# Patient Record
Sex: MALE | Race: WHITE | NOT HISPANIC OR LATINO | Employment: OTHER | URBAN - METROPOLITAN AREA
[De-identification: names, ages, dates, MRNs, and addresses within clinical notes are randomized per-mention and may not be internally consistent; named-entity substitution may affect disease eponyms.]

---

## 2017-01-18 RX ORDER — TAMSULOSIN HYDROCHLORIDE 0.4 MG/1
1 CAPSULE ORAL NIGHTLY
Qty: 30 CAPSULE | Refills: 0 | Status: SHIPPED | OUTPATIENT
Start: 2017-01-18 | End: 2017-02-15 | Stop reason: SDUPTHER

## 2017-02-15 RX ORDER — TAMSULOSIN HYDROCHLORIDE 0.4 MG/1
1 CAPSULE ORAL NIGHTLY
Qty: 30 CAPSULE | Refills: 0 | Status: SHIPPED | OUTPATIENT
Start: 2017-02-15 | End: 2017-03-16 | Stop reason: SDUPTHER

## 2017-03-16 RX ORDER — TAMSULOSIN HYDROCHLORIDE 0.4 MG/1
CAPSULE ORAL
Qty: 28 CAPSULE | Refills: 3 | Status: SHIPPED | OUTPATIENT
Start: 2017-03-16 | End: 2017-07-06 | Stop reason: SDUPTHER

## 2017-06-08 ENCOUNTER — HOSPITAL ENCOUNTER (OUTPATIENT)
Facility: HOSPITAL | Age: 82
Discharge: HOME | End: 2017-06-09
Attending: EMERGENCY MEDICINE | Admitting: EMERGENCY MEDICINE
Payer: MEDICARE

## 2017-06-08 DIAGNOSIS — R55 SYNCOPE: ICD-10-CM

## 2017-06-08 DIAGNOSIS — A49.9 BACTERIAL UTI: Primary | ICD-10-CM

## 2017-06-08 DIAGNOSIS — N39.0 URINARY TRACT INFECTION WITHOUT HEMATURIA: ICD-10-CM

## 2017-06-08 DIAGNOSIS — N39.0 BACTERIAL UTI: Primary | ICD-10-CM

## 2017-06-08 LAB
ALBUMIN SERPL BCP-MCNC: 3.1 G/DL
ALP SERPL-CCNC: 117 U/L
ALT SERPL W/O P-5'-P-CCNC: 8 U/L
ANION GAP SERPL CALC-SCNC: 7 MMOL/L
AST SERPL-CCNC: 14 U/L
BASOPHILS # BLD AUTO: 0.01 K/UL
BASOPHILS NFR BLD: 0.1 %
BILIRUB SERPL-MCNC: 0.5 MG/DL
BNP SERPL-MCNC: 18 PG/ML
BUN SERPL-MCNC: 36 MG/DL
CALCIUM SERPL-MCNC: 8.8 MG/DL
CHLORIDE SERPL-SCNC: 107 MMOL/L
CO2 SERPL-SCNC: 27 MMOL/L
CREAT SERPL-MCNC: 1.3 MG/DL
DIFFERENTIAL METHOD: ABNORMAL
EOSINOPHIL # BLD AUTO: 0.1 K/UL
EOSINOPHIL NFR BLD: 2.1 %
ERYTHROCYTE [DISTWIDTH] IN BLOOD BY AUTOMATED COUNT: 13.5 %
EST. GFR  (AFRICAN AMERICAN): 53.2 ML/MIN/1.73 M^2
EST. GFR  (NON AFRICAN AMERICAN): 46.1 ML/MIN/1.73 M^2
GLUCOSE SERPL-MCNC: 137 MG/DL
HCT VFR BLD AUTO: 39.7 %
HGB BLD-MCNC: 13.4 G/DL
INR PPP: 1
LYMPHOCYTES # BLD AUTO: 0.9 K/UL
LYMPHOCYTES NFR BLD: 13.7 %
MAGNESIUM SERPL-MCNC: 2.1 MG/DL
MCH RBC QN AUTO: 32.8 PG
MCHC RBC AUTO-ENTMCNC: 33.8 %
MCV RBC AUTO: 97 FL
MONOCYTES # BLD AUTO: 0.3 K/UL
MONOCYTES NFR BLD: 4.9 %
NEUTROPHILS # BLD AUTO: 5.3 K/UL
NEUTROPHILS NFR BLD: 79.1 %
PLATELET # BLD AUTO: 158 K/UL
PMV BLD AUTO: 10 FL
POTASSIUM SERPL-SCNC: 3.8 MMOL/L
PROT SERPL-MCNC: 6.4 G/DL
PROTHROMBIN TIME: 10.8 SEC
RBC # BLD AUTO: 4.09 M/UL
SODIUM SERPL-SCNC: 141 MMOL/L
TROPONIN I SERPL DL<=0.01 NG/ML-MCNC: 0.01 NG/ML
WBC # BLD AUTO: 6.7 K/UL

## 2017-06-08 PROCEDURE — 99285 EMERGENCY DEPT VISIT HI MDM: CPT | Mod: 25

## 2017-06-08 PROCEDURE — 81001 URINALYSIS AUTO W/SCOPE: CPT

## 2017-06-08 PROCEDURE — G0378 HOSPITAL OBSERVATION PER HR: HCPCS

## 2017-06-08 PROCEDURE — 25000003 PHARM REV CODE 250: Performed by: EMERGENCY MEDICINE

## 2017-06-08 PROCEDURE — 87086 URINE CULTURE/COLONY COUNT: CPT

## 2017-06-08 PROCEDURE — 93010 ELECTROCARDIOGRAM REPORT: CPT | Mod: ,,, | Performed by: INTERNAL MEDICINE

## 2017-06-08 PROCEDURE — 83880 ASSAY OF NATRIURETIC PEPTIDE: CPT

## 2017-06-08 PROCEDURE — 96360 HYDRATION IV INFUSION INIT: CPT

## 2017-06-08 PROCEDURE — 85610 PROTHROMBIN TIME: CPT

## 2017-06-08 PROCEDURE — 81003 URINALYSIS AUTO W/O SCOPE: CPT

## 2017-06-08 PROCEDURE — 96361 HYDRATE IV INFUSION ADD-ON: CPT

## 2017-06-08 PROCEDURE — 84484 ASSAY OF TROPONIN QUANT: CPT

## 2017-06-08 PROCEDURE — 36415 COLL VENOUS BLD VENIPUNCTURE: CPT

## 2017-06-08 PROCEDURE — 83735 ASSAY OF MAGNESIUM: CPT

## 2017-06-08 PROCEDURE — 99285 EMERGENCY DEPT VISIT HI MDM: CPT | Mod: ,,, | Performed by: EMERGENCY MEDICINE

## 2017-06-08 PROCEDURE — 93005 ELECTROCARDIOGRAM TRACING: CPT

## 2017-06-08 PROCEDURE — 80053 COMPREHEN METABOLIC PANEL: CPT

## 2017-06-08 PROCEDURE — 80048 BASIC METABOLIC PNL TOTAL CA: CPT

## 2017-06-08 PROCEDURE — 85025 COMPLETE CBC W/AUTO DIFF WBC: CPT

## 2017-06-08 RX ORDER — TAMSULOSIN HYDROCHLORIDE 0.4 MG/1
1 CAPSULE ORAL NIGHTLY
Status: DISCONTINUED | OUTPATIENT
Start: 2017-06-09 | End: 2017-06-09 | Stop reason: HOSPADM

## 2017-06-08 RX ORDER — ACETAMINOPHEN 325 MG/1
650 TABLET ORAL EVERY 6 HOURS PRN
Status: DISCONTINUED | OUTPATIENT
Start: 2017-06-09 | End: 2017-06-09 | Stop reason: HOSPADM

## 2017-06-08 RX ORDER — POLYETHYLENE GLYCOL 3350 17 G/17G
17 POWDER, FOR SOLUTION ORAL EVERY 12 HOURS PRN
Status: DISCONTINUED | OUTPATIENT
Start: 2017-06-09 | End: 2017-06-09 | Stop reason: HOSPADM

## 2017-06-08 RX ORDER — RAMELTEON 8 MG/1
8 TABLET ORAL NIGHTLY PRN
Status: DISCONTINUED | OUTPATIENT
Start: 2017-06-09 | End: 2017-06-09 | Stop reason: HOSPADM

## 2017-06-08 RX ADMIN — SODIUM CHLORIDE 1000 ML: 0.9 INJECTION, SOLUTION INTRAVENOUS at 05:06

## 2017-06-08 NOTE — ED PROVIDER NOTES
"Encounter Date: 6/8/2017    SCRIBE #1 NOTE: I, Jennifermandi Rivera, am scribing for, and in the presence of, Dr. Woods.       History     Chief Complaint   Patient presents with    Loss of Consciousness     witnessed syncopal episode for 10 seconds.      Review of patient's allergies indicates:  No Known Allergies  Time seen by provider: 2:32 PM    This is a 89 y.o. male with a PMHx of dementia, CKD, and BPH and no pertinent PSHx who presents for evaluation after a witnessed syncopal episode. Patient's son explains patient was eating lunch when he suddenly "froze," and his head dropped down. Son states patient became "extremely rigid and non-responsive" but was still breathing. He claims he had to pry his jaw open to get the food out. He reports the patient became lucid after two sternal rubs. The episode lasted approximately 1 minute. Patient has no complaints and states he feels fine at this time. He denies chest pain, shortness of breath, headache, and abdominal pain. Patient's son denies a hx of seizures.         The history is provided by the patient, a relative and medical records.     Past Medical History:   Diagnosis Date    BPH (benign prostatic hyperplasia)     Chronic kidney disease     kidney stones    Dementia     Hypertension     Osteoarthritis     Weight loss      Past Surgical History:   Procedure Laterality Date    HEMORRHOID SURGERY      Renal stone extraction       Family History   Problem Relation Age of Onset    Stroke Mother     Heart disease Father      Social History   Substance Use Topics    Smoking status: Never Smoker    Smokeless tobacco: Not on file      Comment: The patient lives at home with his wife who is impaired. His daughter recently has been caring friend, Shakira.    Alcohol use Yes      Comment: whiskey or gin rarely     Review of Systems   Constitutional: Negative for fever.   HENT: Negative for nosebleeds.    Eyes: Negative for redness.   Respiratory: Negative for " shortness of breath.    Cardiovascular: Negative for chest pain.   Gastrointestinal: Negative for abdominal pain.   Genitourinary: Negative for hematuria.   Musculoskeletal: Negative for neck pain.   Skin: Negative for rash.   Neurological: Positive for syncope. Negative for speech difficulty and headaches.       Physical Exam     Initial Vitals [06/08/17 1307]   BP Pulse Resp Temp SpO2   130/86 72 16 98.1 °F (36.7 °C) 96 %     Physical Exam    Nursing note and vitals reviewed.  Constitutional: He appears well-developed and well-nourished. No distress.   HENT:   Head: Normocephalic and atraumatic.   Mouth/Throat: Oropharynx is clear and moist.   Eyes: Conjunctivae are normal.   Neck: Normal range of motion.   Cardiovascular: Normal rate, regular rhythm and normal heart sounds.   Pulmonary/Chest: Breath sounds normal. No respiratory distress.   Abdominal: Soft. He exhibits no distension. There is no tenderness.   Musculoskeletal: Normal range of motion.   Neurological: He is alert and oriented to person, place, and time. He has normal strength. No cranial nerve deficit or sensory deficit.   Skin: Skin is warm and dry.         ED Course   Procedures  Labs Reviewed   CBC W/ AUTO DIFFERENTIAL - Abnormal; Notable for the following:        Result Value    RBC 4.09 (*)     Hemoglobin 13.4 (*)     Hematocrit 39.7 (*)     MCH 32.8 (*)     Lymph # 0.9 (*)     Gran% 79.1 (*)     Lymph% 13.7 (*)     All other components within normal limits   COMPREHENSIVE METABOLIC PANEL - Abnormal; Notable for the following:     Glucose 137 (*)     BUN, Bld 36 (*)     Albumin 3.1 (*)     ALT 8 (*)     Anion Gap 7 (*)     eGFR if  53.2 (*)     eGFR if non  46.1 (*)     All other components within normal limits   MAGNESIUM   PROTIME-INR   TROPONIN I   B-TYPE NATRIURETIC PEPTIDE     EKG Readings: (Independently Interpreted)   Sinus at 67. Left anterior fascicular block.       X-Rays:   Independently Interpreted  Readings:   Chest X-Ray: Normal heart size.  No infiltrates.  No acute abnormalities.     Medical Decision Making:   History:   Old Medical Records: I decided to obtain old medical records.  Initial Assessment:   Emergent evaluation of syncopal episode, witnessed. Initial concern for lethal cardiac dysrhythmia, vagovagal episode, aspiration event. Unlikely to be CVA given complete resolution of symptoms at this time. I will get labs, EKG, cardiac monitoring, and reassess. Patient is DNR per son.   Independently Interpreted Test(s):   I have ordered and independently interpreted X-rays - see prior notes.  I have ordered and independently interpreted EKG Reading(s) - see prior notes  Clinical Tests:   Lab Tests: Ordered and Reviewed  Radiological Study: Ordered and Reviewed  Medical Tests: Ordered and Reviewed            Scribe Attestation:   Scribe #1: I performed the above scribed service and the documentation accurately describes the services I performed. I attest to the accuracy of the note.    Attending Attestation:           Physician Attestation for Scribe:  Physician Attestation Statement for Scribe #1: I, Dr. Woods, reviewed documentation, as scribed by Jennifer Rivera in my presence, and it is both accurate and complete.         Attending ED Notes:   Patient remains asymptomatic. I will admit to the hospital for cardiac monitoring given his syncope and risk factors. Son was updated.          ED Course     Clinical Impression:   The primary encounter diagnosis was Bacterial UTI. A diagnosis of Syncope was also pertinent to this visit.    Disposition:   Disposition: Admitted       Erik Woods MD  06/09/17 6926

## 2017-06-08 NOTE — ED NOTES
Patient identifiers verified and correct for Ruben Jaye.   LOC: The patient is awake, alert and aware of environment with an appropriate affect, the patient is oriented to person only.  APPEARANCE: Patient appears comfortable and in no acute distress, patient is clean and well groomed.  SKIN: The skin is warm and dry, color consistent with ethnicity, patient has normal skin turgor and moist mucus membranes, skin intact, no breakdown or bruising noted.   MUSCULOSKELETAL: Patient moving all extremities spontaneously, no swelling noted.  RESPIRATORY: Airway is open and patent, respirations are spontaneous, patient has a normal effort and rate, no accessory muscle use noted, pt placed on continuous pulse ox with O2 sats noted at 97% on room air.  CARDIAC: Pt placed on cardiac monitor. Patient has a normal rate and regular rhythm, no edema noted, capillary refill < 3 seconds.   GASTRO: Soft and non tender to palpation, no distention noted, normoactive bowel sounds present in all four quadrants. Pt states bowel movements have been regular.  : Pt denies any pain or frequency with urination.  NEURO: Pt opens eyes spontaneously, behavior appropriate to situation, follows commands, facial expression symmetrical, bilateral hand grasp equal and even, purposeful motor response noted, normal sensation in all extremities when touched with a finger.

## 2017-06-08 NOTE — ED TRIAGE NOTES
Pt was at nursing facility today with his son. Son states that pt had an episode of unresponsiveness that lasted less than 1 minute. This incident occurred at [id number] today. Pt denies ha/n/v/diarrhea

## 2017-06-09 VITALS
DIASTOLIC BLOOD PRESSURE: 78 MMHG | TEMPERATURE: 98 F | WEIGHT: 160.06 LBS | BODY MASS INDEX: 26.67 KG/M2 | SYSTOLIC BLOOD PRESSURE: 146 MMHG | RESPIRATION RATE: 18 BRPM | HEIGHT: 65 IN | HEART RATE: 76 BPM | OXYGEN SATURATION: 96 %

## 2017-06-09 LAB
ANION GAP SERPL CALC-SCNC: 6 MMOL/L
BASOPHILS # BLD AUTO: 0.02 K/UL
BASOPHILS NFR BLD: 0.3 %
BILIRUB UR QL STRIP: NEGATIVE
BUN SERPL-MCNC: 33 MG/DL
CALCIUM SERPL-MCNC: 8.5 MG/DL
CHLORIDE SERPL-SCNC: 108 MMOL/L
CLARITY UR REFRACT.AUTO: ABNORMAL
CO2 SERPL-SCNC: 26 MMOL/L
COLOR UR AUTO: YELLOW
CREAT SERPL-MCNC: 1.2 MG/DL
DIFFERENTIAL METHOD: ABNORMAL
EOSINOPHIL # BLD AUTO: 0.4 K/UL
EOSINOPHIL NFR BLD: 6.3 %
ERYTHROCYTE [DISTWIDTH] IN BLOOD BY AUTOMATED COUNT: 13.2 %
EST. GFR  (AFRICAN AMERICAN): 58.6 ML/MIN/1.73 M^2
EST. GFR  (NON AFRICAN AMERICAN): 50.7 ML/MIN/1.73 M^2
GLUCOSE SERPL-MCNC: 119 MG/DL
GLUCOSE UR QL STRIP: NEGATIVE
HCT VFR BLD AUTO: 38 %
HGB BLD-MCNC: 12.8 G/DL
HGB UR QL STRIP: ABNORMAL
KETONES UR QL STRIP: ABNORMAL
LEUKOCYTE ESTERASE UR QL STRIP: ABNORMAL
LYMPHOCYTES # BLD AUTO: 2.1 K/UL
LYMPHOCYTES NFR BLD: 31.9 %
MCH RBC QN AUTO: 32.2 PG
MCHC RBC AUTO-ENTMCNC: 33.7 %
MCV RBC AUTO: 96 FL
MICROSCOPIC COMMENT: ABNORMAL
MONOCYTES # BLD AUTO: 0.6 K/UL
MONOCYTES NFR BLD: 9.2 %
NEUTROPHILS # BLD AUTO: 3.4 K/UL
NEUTROPHILS NFR BLD: 52.1 %
NITRITE UR QL STRIP: NEGATIVE
PH UR STRIP: 5 [PH] (ref 5–8)
PLATELET # BLD AUTO: 156 K/UL
PMV BLD AUTO: 9.8 FL
POTASSIUM SERPL-SCNC: 3.8 MMOL/L
PROT UR QL STRIP: NEGATIVE
RBC # BLD AUTO: 3.97 M/UL
RBC #/AREA URNS AUTO: 14 /HPF (ref 0–4)
SODIUM SERPL-SCNC: 140 MMOL/L
SP GR UR STRIP: 1.01 (ref 1–1.03)
URN SPEC COLLECT METH UR: ABNORMAL
UROBILINOGEN UR STRIP-ACNC: NEGATIVE EU/DL
WBC # BLD AUTO: 6.49 K/UL
WBC #/AREA URNS AUTO: 35 /HPF (ref 0–5)

## 2017-06-09 PROCEDURE — 36415 COLL VENOUS BLD VENIPUNCTURE: CPT

## 2017-06-09 PROCEDURE — 85025 COMPLETE CBC W/AUTO DIFF WBC: CPT

## 2017-06-09 PROCEDURE — G0378 HOSPITAL OBSERVATION PER HR: HCPCS

## 2017-06-09 PROCEDURE — 99217 PR OBSERVATION CARE DISCHARGE: CPT | Mod: ,,, | Performed by: PHYSICIAN ASSISTANT

## 2017-06-09 PROCEDURE — 63600175 PHARM REV CODE 636 W HCPCS: Performed by: PHYSICIAN ASSISTANT

## 2017-06-09 PROCEDURE — 97161 PT EVAL LOW COMPLEX 20 MIN: CPT

## 2017-06-09 PROCEDURE — 97116 GAIT TRAINING THERAPY: CPT

## 2017-06-09 PROCEDURE — G8979 MOBILITY GOAL STATUS: HCPCS | Mod: CJ

## 2017-06-09 PROCEDURE — G8978 MOBILITY CURRENT STATUS: HCPCS | Mod: CK

## 2017-06-09 PROCEDURE — G8980 MOBILITY D/C STATUS: HCPCS | Mod: CK

## 2017-06-09 RX ORDER — CIPROFLOXACIN 500 MG/1
500 TABLET ORAL 2 TIMES DAILY
Qty: 10 TABLET | Refills: 0 | Status: SHIPPED | OUTPATIENT
Start: 2017-06-09 | End: 2017-06-14

## 2017-06-09 RX ADMIN — CEFTRIAXONE 1 G: 1 INJECTION, SOLUTION INTRAVENOUS at 08:06

## 2017-06-09 NOTE — PLAN OF CARE
Problem: Patient Care Overview  Goal: Plan of Care Review  Outcome: Ongoing (interventions implemented as appropriate)  Pt on fall precautions. Yellow fall risk band and socks on pt. Instructed pt to call for assistance as needed and pt voiced understanding. Disoriented to time and place. SR with PVCs on tele.

## 2017-06-09 NOTE — ASSESSMENT & PLAN NOTE
- Pt with witnessed syncopal episode, lasting only a few seconds  - EKG NSR with PAC.  CXR no active cardiopulmonary disease.  - No events on tele  - family states patient at baseline throughout hospital stay  - treat UTI as below

## 2017-06-09 NOTE — ASSESSMENT & PLAN NOTE
- UA+, given rocephin x 1, will d/c with cipro x 5 days  - previous cultures growing pan-sensitive proteus  - patient non-toxic, no leukocytosis, no fever, at baseline MS per family

## 2017-06-09 NOTE — DISCHARGE SUMMARY
[facility] Hospital Medicine  Discharge Summary      Patient Name: Rbuen Cee Sr.  MRN: [id number]  Admission Date: 6/8/2017  Hospital Length of Stay: 0 days  Discharge Date and Time:  06/09/2017 10:03 AM  Attending Physician: Tristen Van MD   Discharging Provider: Dawson Lópze PA-C  Primary Care Provider: Torres Carcamo Jr, MD  [facility] Medicine Team: [facility] MED F Dawson López PA-C    HPI:   89 y/o M with PMH dementia, osteoarthritis, benign prostatic hyperplasia, and chronic kidney disease stage 3 presents to ED after witnessed syncopal episode. Pt lives at [facility] in [place].  Pt's son Jose was eating lunch with him today and pt suddenly slumped forward to his plate.  Pt's son state he was very stiff and rigid for ~1 min.  Pt responded to sternal rub.  Pt's son denies convulsion, loss of bowel or bladder function, tongue biting.  No drooling, slurring of speech.  Son cannot recall this happening before. Pt does have history of orthostatic hypotension and seroquel stopped during admission 2014.  Pt ambulates with a cane or walker. His primary care physician is Dr. Torres Carcamo. He is DNR.  Pt denies CP, SOB, N/V/D, dysuria.    In the ED, EKG with NSR.  Kidney function at baseline.  CXR with no active cardiopulmonary disease.  In the ED, pt given 1L NS.    * No surgery found *      Indwelling Lines/Drains at time of discharge:   Lines/Drains/Airways          No matching active lines, drains, or airways        Hospital Course:   Pt admitted to observation for syncopal episode. Intake labs unremarkable. CXR with no active cardiopulmonary disease. UA+ and patient given 1 dose of rocephin while admitted, he will be discharged on cipro x 5 days. Urine culture not resulted by time of discharge, but previous cultures with pan-sensitive proteus. Patient was positive orthostatic going from 150s-120s SBP when standing, but this is chronic and patient was sitting while  syncopal event occurred. No arrhythmias on tele overnight. Patient's family states that bedsides the initial 10 second episode of unresponsiveness, he has been at his baseline. Patient discharged back to NH with antibiotics.      Consults:     Significant Diagnostic Studies: Labs:   CMP   Recent Labs  Lab 06/08/17  1448 06/08/17  2317    140   K 3.8 3.8    108   CO2 27 26   * 119*   BUN 36* 33*   CREATININE 1.3 1.2   CALCIUM 8.8 8.5*   PROT 6.4  --    ALBUMIN 3.1*  --    BILITOT 0.5  --    ALKPHOS 117  --    AST 14  --    ALT 8*  --    ANIONGAP 7* 6*   ESTGFRAFRICA 53.2* 58.6*   EGFRNONAA 46.1* 50.7*   , CBC   Recent Labs  Lab 06/08/17  1448 06/09/17  0535   WBC 6.70 6.49   HGB 13.4* 12.8*   HCT 39.7* 38.0*    156    and All labs within the past 24 hours have been reviewed  Microbiology:   Urine Culture    Lab Results   Component Value Date    LABURIN PROTEUS MIRABILIS  >100,000 cfu/ml   12/02/2016       Pending Diagnostic Studies:     None        Final Active Diagnoses:    Diagnosis Date Noted POA    PRINCIPAL PROBLEM:  Syncope [R55] 06/08/2017 Yes    Orthostatic hypotension [I95.1] 05/27/2014 Yes    Urinary tract infection without hematuria [N39.0] 04/08/2014 Yes    Essential hypertension [I10] 04/10/2014 Yes     Chronic    Benign prostatic hyperplasia [N40.0] 04/07/2014 Yes     Chronic    CKD (chronic kidney disease) stage 3, GFR 30-59 ml/min [N18.3] 04/02/2014 Yes     Chronic    Osteoarthritis [M19.90] 01/07/2013 Yes     Chronic      Problems Resolved During this Admission:    Diagnosis Date Noted Date Resolved POA      * Syncope    - Pt with witnessed syncopal episode, lasting only a few seconds  - EKG NSR with PAC.  CXR no active cardiopulmonary disease.  - No events on tele  - family states patient at baseline throughout hospital stay  - treat UTI as below        Orthostatic hypotension    As above  - vitals SBP 150s to 120s on standing, chronic problem        Urinary tract  infection without hematuria    - UA+, given rocephin x 1, will d/c with cipro x 5 days  - previous cultures growing pan-sensitive proteus  - patient non-toxic, no leukocytosis, no fever, at baseline MS per family        Essential hypertension    Controlled on admission, not on antihypertensives        Benign prostatic hyperplasia    Will continue flomax        CKD (chronic kidney disease) stage 3, GFR 30-59 ml/min    Baseline Cr 1.3-1.4, pt at baseline        Osteoarthritis    - d/c home            Discharged Condition: fair    Disposition: Home or Self Care    Follow Up:  Follow-up Information     Torres Carcamo Jr, MD In 2 weeks.    Specialty:  Internal Medicine  Contact information:  [address] [facility] [id number] [phone]                 Patient Instructions:     Diet general     Activity as tolerated     Call MD for:  temperature >100.4     Call MD for:  persistent nausea and vomiting or diarrhea     Call MD for:  increased confusion or weakness     Call MD for:  persistent dizziness, light-headedness, or visual disturbances       Medications:  Reconciled Home Medications:   Current Discharge Medication List      START taking these medications    Details   ciprofloxacin HCl (CIPRO) 500 MG tablet Take 1 tablet (500 mg total) by mouth 2 (two) times daily.  Qty: 10 tablet, Refills: 0         CONTINUE these medications which have NOT CHANGED    Details   tamsulosin (FLOMAX) 0.4 mg Cp24 TAKE 1 CAPSULE BY MOUTH EVERY EVENING  Qty: 28 capsule, Refills: 3    Comments: Maximum Refills Reached           Time spent on the discharge of patient: 30 minutes    Dawson López PA-C  Department of Hospital Medicine  [facility]

## 2017-06-09 NOTE — PLAN OF CARE
Problem: Patient Care Overview  Goal: Plan of Care Review  Outcome: Ongoing (interventions implemented as appropriate)  Pt. Placed on fall precautions due to generalized weakness,no falls sustained. Pt. Has a hx of dementia,oriented to person and place only,bed alarm on,pt. Forgets to call and gets up.Placed on tele,rate in 60-70's,NSR no c/o cp continue plan of care

## 2017-06-09 NOTE — HOSPITAL COURSE
Pt admitted to observation for syncopal episode. Intake labs unremarkable. CXR with no active cardiopulmonary disease. UA+ and patient given 1 dose of rocephin while admitted, he will be discharged on cipro x 5 days. Urine culture not resulted by time of discharge, but previous cultures with pan-sensitive proteus. Patient was positive orthostatic going from 150s-120s SBP when standing, but this is chronic and patient was sitting while syncopal event occurred. No arrhythmias on tele overnight. Patient's family states that bedsides the initial 10 second episode of unresponsiveness, he has been at his baseline. Patient discharged back to NH with antibiotics.

## 2017-06-09 NOTE — H&P
[facility] Hospital Medicine  History & Physical    Patient Name: Ruben Cee Sr.  MRN: [id number]  Admission Date: 6/8/2017  Attending Physician: Tristen Van MD   Primary Care Provider: Torres Carcamo Jr, MD    [facility] Medicine Team: [facility] MED F Jannie Bates PA-C     Patient information was obtained from patient, relative(s) and ER records.     Subjective:     Principal Problem:Syncope    Chief Complaint:   Chief Complaint   Patient presents with    Loss of Consciousness     witnessed syncopal episode for 10 seconds.         HPI: 89 y/o M with PMH dementia, osteoarthritis, benign prostatic hyperplasia, and chronic kidney disease stage 3 presents to ED after witnessed syncopal episode. Pt lives at [facility] in [place].  Pt's son Jose was eating lunch with him today and pt suddenly slumped forward to his plate.  Pt's son state he was very stiff and rigid for ~1 min.  Pt responded to sternal rub.  Pt's son denies convulsion, loss of bowel or bladder function, tongue biting.  No drooling, slurring of speech.  Son cannot recall this happening before. Pt does have history of orthostatic hypotension and seroquel stopped during admission 2014.  Pt ambulates with a cane or walker. His primary care physician is Dr. Torres Carcamo. He is DNR.  Pt denies CP, SOB, N/V/D, dysuria.    In the ED, EKG with NSR.  Kidney function at baseline.  CXR with no active cardiopulmonary disease.  In the ED, pt given 1L NS.    Past Medical History:   Diagnosis Date    BPH (benign prostatic hyperplasia)     Chronic kidney disease     kidney stones    Dementia     Hypertension     Osteoarthritis     Weight loss        Past Surgical History:   Procedure Laterality Date    HEMORRHOID SURGERY      Renal stone extraction         Review of patient's allergies indicates:  No Known Allergies    No current facility-administered medications on file prior to encounter.      Current  Outpatient Prescriptions on File Prior to Encounter   Medication Sig    tamsulosin (FLOMAX) 0.4 mg Cp24 TAKE 1 CAPSULE BY MOUTH EVERY EVENING     Family History     Problem Relation (Age of Onset)    Heart disease Father    Stroke Mother        Social History Main Topics    Smoking status: Never Smoker    Smokeless tobacco: Not on file      Comment: The patient lives at home with his wife who is impaired. His daughter recently has been caring friend, Shakira.    Alcohol use Yes      Comment: whiskey or gin rarely    Drug use: No    Sexual activity: No     Review of Systems   Constitutional: Negative for chills, diaphoresis, fatigue, fever and unexpected weight change.   HENT: Positive for hearing loss (chronic). Negative for drooling, facial swelling, trouble swallowing and voice change.    Eyes: Negative for photophobia, pain, redness and visual disturbance.   Respiratory: Negative for cough, chest tightness, shortness of breath and wheezing.    Cardiovascular: Negative for chest pain, palpitations and leg swelling.   Gastrointestinal: Negative for abdominal pain, constipation, diarrhea, nausea and vomiting.   Endocrine: Negative for polydipsia, polyphagia and polyuria.   Genitourinary: Negative for dysuria and frequency.   Musculoskeletal: Negative for arthralgias, back pain, myalgias and neck pain.   Skin: Negative for color change, pallor, rash and wound.   Neurological: Negative for dizziness, tremors, facial asymmetry, weakness and light-headedness.   Hematological: Negative for adenopathy. Does not bruise/bleed easily.   Psychiatric/Behavioral: Negative for behavioral problems, confusion, decreased concentration, dysphoric mood and sleep disturbance. The patient is not nervous/anxious.      Objective:     Vital Signs (Most Recent):  Temp: 98 °F (36.7 °C) (06/08/17 1938)  Pulse: 63 (06/08/17 2053)  Resp: 13 (06/08/17 1446)  BP: (!) 168/81 (06/08/17 1931)  SpO2: 98 % (06/08/17 1931) Vital Signs (24h  Range):  Temp:  [98 °F (36.7 °C)-98.1 °F (36.7 °C)] 98 °F (36.7 °C)  Pulse:  [54-93] 63  Resp:  [13-16] 13  SpO2:  [96 %-99 %] 98 %  BP: (106-168)/(58-86) 168/81     Weight: 72.6 kg (160 lb)  Body mass index is 23.63 kg/m².    Physical Exam   Constitutional: He is oriented to person, place, and time. He appears well-developed and well-nourished.   HENT:   Head: Normocephalic and atraumatic.   Right Ear: Decreased hearing is noted.   Left Ear: Decreased hearing is noted.   Eyes: EOM are normal. Pupils are equal, round, and reactive to light.   Neck: Normal range of motion. Neck supple. No tracheal deviation present. No thyromegaly present.   Cardiovascular: Normal rate and regular rhythm.    No murmur heard.  Pulmonary/Chest: Effort normal and breath sounds normal. He has no wheezes.   Abdominal: Soft. Bowel sounds are normal. There is no tenderness.   Musculoskeletal: Normal range of motion. He exhibits no edema or tenderness.   Lymphadenopathy:     He has no cervical adenopathy.   Neurological: He is alert and oriented to person, place, and time. He has normal reflexes. No cranial nerve deficit.   Skin: Skin is warm and dry.   Psychiatric: He has a normal mood and affect. His behavior is normal.   Nursing note and vitals reviewed.       Significant Labs: All pertinent labs within the past 24 hours have been reviewed.    Significant Imaging: I have reviewed all pertinent imaging results/findings within the past 24 hours.    Assessment/Plan:     * Syncope    Pt with witnessed syncopal episode  EKG NSR with PAC.  CXR no active cardiopulmonary disease.  Pt placed on telemetry.  Pt with elevated BUN normal Cr and given 1L fluid bolus in the ED.  Will check orthostatic and UA.  Continue to monitor CBC and BMP.  Will order PT/OT.          Orthostatic hypotension    As above          Essential hypertension    Controlled on admission          CKD (chronic kidney disease) stage 3, GFR 30-59 ml/min    Baseline Cr 1.3-1.4, pt  at baseline          Benign prostatic hyperplasia    Will continue flomax          Osteoarthritis    PT/OT ordered            VTE Risk Mitigation     None        Jannie Bates PA-C  Department of Hospital Medicine   [facility]

## 2017-06-09 NOTE — NURSING
Received to room 02 per stretcher at 2030,awake and alert oriented to person and place only. Skin warm and dry to touch color pink. Oriented to room and call bell system. Placed on tele monitor,nad voices no complaints

## 2017-06-09 NOTE — ASSESSMENT & PLAN NOTE
Pt with witnessed syncopal episode  EKG NSR with PAC.  CXR no active cardiopulmonary disease.  Pt placed on telemetry.  Pt with elevated BUN normal Cr and given 1L fluid bolus in the ED.  Will check orthostatic and UA.  Continue to monitor CBC and BMP.  Will order PT/OT.

## 2017-06-09 NOTE — HPI
89 y/o M with PMH dementia, osteoarthritis, benign prostatic hyperplasia, and chronic kidney disease stage 3 presents to ED after witnessed syncopal episode. Pt lives at [facility] in [place].  Pt's son Jose was eating lunch with him today and pt suddenly slumped forward to his plate.  Pt's son state he was very stiff and rigid for ~1 min.  Pt responded to sternal rub.  Pt's son denies convulsion, loss of bowel or bladder function, tongue biting.  No drooling, slurring of speech.  Son cannot recall this happening before. Pt does have history of orthostatic hypotension and seroquel stopped during admission 2014.  Pt ambulates with a cane or walker. His primary care physician is Dr. Torres Carcamo. He is DNR.  Pt denies CP, SOB, N/V/D, dysuria.    In the ED, EKG with NSR.  Kidney function at baseline.  CXR with no active cardiopulmonary disease.  In the ED, pt given 1L NS.

## 2017-06-09 NOTE — SUBJECTIVE & OBJECTIVE
Past Medical History:   Diagnosis Date    BPH (benign prostatic hyperplasia)     Chronic kidney disease     kidney stones    Dementia     Hypertension     Osteoarthritis     Weight loss        Past Surgical History:   Procedure Laterality Date    HEMORRHOID SURGERY      Renal stone extraction         Review of patient's allergies indicates:  No Known Allergies    No current facility-administered medications on file prior to encounter.      Current Outpatient Prescriptions on File Prior to Encounter   Medication Sig    tamsulosin (FLOMAX) 0.4 mg Cp24 TAKE 1 CAPSULE BY MOUTH EVERY EVENING     Family History     Problem Relation (Age of Onset)    Heart disease Father    Stroke Mother        Social History Main Topics    Smoking status: Never Smoker    Smokeless tobacco: Not on file      Comment: The patient lives at home with his wife who is impaired. His daughter recently has been caring friend, Shakira.    Alcohol use Yes      Comment: whiskey or gin rarely    Drug use: No    Sexual activity: No     Review of Systems   Constitutional: Negative for chills, diaphoresis, fatigue, fever and unexpected weight change.   HENT: Positive for hearing loss (chronic). Negative for drooling, facial swelling, trouble swallowing and voice change.    Eyes: Negative for photophobia, pain, redness and visual disturbance.   Respiratory: Negative for cough, chest tightness, shortness of breath and wheezing.    Cardiovascular: Negative for chest pain, palpitations and leg swelling.   Gastrointestinal: Negative for abdominal pain, constipation, diarrhea, nausea and vomiting.   Endocrine: Negative for polydipsia, polyphagia and polyuria.   Genitourinary: Negative for dysuria and frequency.   Musculoskeletal: Negative for arthralgias, back pain, myalgias and neck pain.   Skin: Negative for color change, pallor, rash and wound.   Neurological: Negative for dizziness, tremors, facial asymmetry, weakness and light-headedness.    Hematological: Negative for adenopathy. Does not bruise/bleed easily.   Psychiatric/Behavioral: Negative for behavioral problems, confusion, decreased concentration, dysphoric mood and sleep disturbance. The patient is not nervous/anxious.      Objective:     Vital Signs (Most Recent):  Temp: 98 °F (36.7 °C) (06/08/17 1938)  Pulse: 63 (06/08/17 2053)  Resp: 13 (06/08/17 1446)  BP: (!) 168/81 (06/08/17 1931)  SpO2: 98 % (06/08/17 1931) Vital Signs (24h Range):  Temp:  [98 °F (36.7 °C)-98.1 °F (36.7 °C)] 98 °F (36.7 °C)  Pulse:  [54-93] 63  Resp:  [13-16] 13  SpO2:  [96 %-99 %] 98 %  BP: (106-168)/(58-86) 168/81     Weight: 72.6 kg (160 lb)  Body mass index is 23.63 kg/m².    Physical Exam   Constitutional: He is oriented to person, place, and time. He appears well-developed and well-nourished.   HENT:   Head: Normocephalic and atraumatic.   Right Ear: Decreased hearing is noted.   Left Ear: Decreased hearing is noted.   Eyes: EOM are normal. Pupils are equal, round, and reactive to light.   Neck: Normal range of motion. Neck supple. No tracheal deviation present. No thyromegaly present.   Cardiovascular: Normal rate and regular rhythm.    No murmur heard.  Pulmonary/Chest: Effort normal and breath sounds normal. He has no wheezes.   Abdominal: Soft. Bowel sounds are normal. There is no tenderness.   Musculoskeletal: Normal range of motion. He exhibits no edema or tenderness.   Lymphadenopathy:     He has no cervical adenopathy.   Neurological: He is alert and oriented to person, place, and time. He has normal reflexes. No cranial nerve deficit.   Skin: Skin is warm and dry.   Psychiatric: He has a normal mood and affect. His behavior is normal.   Nursing note and vitals reviewed.       Significant Labs: All pertinent labs within the past 24 hours have been reviewed.    Significant Imaging: I have reviewed all pertinent imaging results/findings within the past 24 hours.

## 2017-06-09 NOTE — PT/OT/SLP EVAL
Physical Therapy  Evaluation/  DISCHARGE    Ruben Cee Sr.   MRN: [id number]   Admitting Diagnosis: Syncope    PT Received On: 06/09/17  PT Start Time: 1113     PT Stop Time: 1137    PT Total Time (min): 24 min       Billable Minutes:  Evaluation 14 and Gait [id number]    Diagnosis: Syncope      Past Medical History:   Diagnosis Date    BPH (benign prostatic hyperplasia)     Chronic kidney disease     kidney stones    Dementia     Hypertension     Osteoarthritis     Weight loss       Past Surgical History:   Procedure Laterality Date    HEMORRHOID SURGERY      Renal stone extraction         Referring physician: Tirsten Van MD   Date referred to PT: 6/9/17    General Precautions: Standard, fall  Orthopedic Precautions: N/A   Braces: N/A       Do you have any cultural, spiritual, Protestant conflicts, given your current situation?: none    Patient History:  Lives With: facility resident  Living Arrangements: assisted living  Home Layout: Able to live on 1st floor  Stair Railings at Home: none  Transportation Available: family or friend will provide  Equipment Currently Used at Home: walker, rolling, cane, straight  DME owned (not currently used): rolling walker and single point cane    Previous Level of Function:  Ambulation Skills: needs device (Amb with RW)  Transfer Skills: independent  ADL Skills: needs assist  Work/Leisure Activity: unable to perform (Pt does not drive)    Subjective:  Communicated with nursing prior to session.    Chief Complaint: Confusion  Patient goals: To go home    Pain/Comfort  Pain Rating 1: 0/10      Objective:   Patient found with: telemetry, peripheral IV     Cognitive Exam:  Oriented to: Person, Place, Time and Situation    Follows Commands/attention: Follows two-step commands  Communication: clear/fluent  Safety awareness/insight to disability: intact    Physical Exam:  Postural examination/scapula alignment: Rounded shoulder    Skin integrity: Visible skin  intact  Edema: None noted     Sensation:   Intact  light/touch B LEs    Upper Extremity Range of Motion:  Right Upper Extremity: WFL  Left Upper Extremity: WFL    Upper Extremity Strength:  Right Upper Extremity: WFL  Left Upper Extremity: WFL    Lower Extremity Range of Motion:  Right Lower Extremity: WFL  Left Lower Extremity: WFL    Lower Extremity Strength:  Right Lower Extremity: WFL  Left Lower Extremity: WFL     Fine motor coordination:  Intact  RLE heel shin and LLE heel shin    Gross motor coordination: WFL    Functional Mobility:  Bed Mobility:  Rolling/Turning Right: Supervision  Scooting/Bridging: Supervision  Supine to Sit: Stand by Assistance  Sit to Supine: Supervision    Transfers:  Sit <> Stand Assistance: Contact Guard Assistance (Pt perf multiple times, A for hand placement)  Sit <> Stand Assistive Device: Rolling Walker    Gait:   Gait Distance: Pt amb 186', with A to maneuver RW and to keep body within RW frame  Assistance 1: Minimum assistance  Gait Assistive Device: Rolling walker  Gait Pattern: swing-to gait  Gait Deviation(s): decreased darrell, increased time in double stance, decreased step length, decreased stride length, forward lean    Balance:   Static Sit: FAIR+: Able to take MINIMAL challenges from all directions  Dynamic Sit: FAIR+: Maintains balance through MINIMAL excursions of active trunk motion  Static Stand: FAIR: Maintains without assist but unable to take challenges  Dynamic stand: FAIR: Needs CONTACT GUARD during gait    Therapeutic Activities and Exercises:  LB dressing with SBA, only requires modA for sit<>stand, able to complete donning pants in stance with SBA only    AM-PAC 6 CLICK MOBILITY  How much help from another person does this patient currently need?   1 = Unable, Total/Dependent Assistance  2 = A lot, Maximum/Moderate Assistance  3 = A little, Minimum/Contact Guard/Supervision  4 = None, Modified [place]/Independent    Turning over in bed (including  adjusting bedclothes, sheets and blankets)?: 4  Sitting down on and standing up from a chair with arms (e.g., wheelchair, bedside commode, etc.): 3  Moving from lying on back to sitting on the side of the bed?: 4  Moving to and from a bed to a chair (including a wheelchair)?: 3  Need to walk in hospital room?: 3  Climbing 3-5 steps with a railing?: 2  Total Score: 19     AM-PAC Raw Score CMS G-Code Modifier Level of Impairment Assistance   6 % Total / Unable   7 - 9 CM 80 - 100% Maximal Assist   10 - 14 CL 60 - 80% Moderate Assist   15 - 19 CK 40 - 60% Moderate Assist   20 - 22 CJ 20 - 40% Minimal Assist   23 CI 1-20% SBA / CGA   24 CH 0% Independent/ Mod I     Patient left supine with all lines intact, call button in reach, nursing notified and son present.    Assessment:   Ruben Cee Sr. is a 89 y.o. male with a medical diagnosis of Syncope and presents with impairments in gait, that will increase the pts likelihood for a fall.  Pt is to be discharged to his [facility] at this time.  Recommendation for pt to receive skilled PT services in the home setting to address the below impairments.     Rehab identified problem list/impairments: Rehab identified problem list/impairments: weakness, impaired endurance, impaired cardiopulmonary response to activity, decreased safety awareness, impaired functional mobilty, impaired self care skills, gait instability, impaired balance    Rehab potential is fair.    Activity tolerance: Fair    Discharge recommendations: Discharge Facility/Level Of Care Needs: home health PT     Barriers to discharge: Barriers to Discharge: None    Equipment recommendations: Equipment Needed After Discharge: none     GOALS:    Physical Therapy Goals     Not on file                PLAN:    Patient to be seen   to address the above listed problems via    Plan of Care expires:    Plan of Care reviewed with: patient, son          Ximena Johnsonin, PT  06/09/2017

## 2017-06-09 NOTE — PROGRESS NOTES
Pt discharged to home. Discharge instructions given to pt and son who voiced understanding. IV removed catheter intact. Denies pain or discomfort. Respirations even and unlabored. No distress noted. Pt stable. Awaiting wheelchair for discharge home with son at side.

## 2017-06-10 LAB
BACTERIA UR CULT: NORMAL
BACTERIA UR CULT: NORMAL

## 2017-07-07 RX ORDER — TAMSULOSIN HYDROCHLORIDE 0.4 MG/1
CAPSULE ORAL
Qty: 28 CAPSULE | Refills: 0 | Status: SHIPPED | OUTPATIENT
Start: 2017-07-07 | End: 2017-08-02 | Stop reason: SDUPTHER

## 2017-08-04 RX ORDER — TAMSULOSIN HYDROCHLORIDE 0.4 MG/1
CAPSULE ORAL
Qty: 28 CAPSULE | Refills: 1 | Status: SHIPPED | OUTPATIENT
Start: 2017-08-04 | End: 2017-09-26 | Stop reason: SDUPTHER

## 2017-09-12 ENCOUNTER — HOSPITAL ENCOUNTER (EMERGENCY)
Facility: HOSPITAL | Age: 82
Discharge: HOME | End: 2017-09-12
Attending: EMERGENCY MEDICINE
Payer: MEDICARE

## 2017-09-12 VITALS
TEMPERATURE: 96 F | BODY MASS INDEX: 22.22 KG/M2 | SYSTOLIC BLOOD PRESSURE: 145 MMHG | OXYGEN SATURATION: 97 % | HEART RATE: 61 BPM | WEIGHT: 150 LBS | RESPIRATION RATE: 19 BRPM | HEIGHT: 69 IN | DIASTOLIC BLOOD PRESSURE: 79 MMHG

## 2017-09-12 DIAGNOSIS — R73.9 BLOOD GLUCOSE ELEVATED: Primary | ICD-10-CM

## 2017-09-12 DIAGNOSIS — R55 SYNCOPE: ICD-10-CM

## 2017-09-12 LAB
ALBUMIN SERPL BCP-MCNC: 3 G/DL
ALP SERPL-CCNC: 85 U/L
ALT SERPL W/O P-5'-P-CCNC: 7 U/L
ANION GAP SERPL CALC-SCNC: 9 MMOL/L
AST SERPL-CCNC: 15 U/L
BACTERIA #/AREA URNS HPF: ABNORMAL /HPF
BASOPHILS # BLD AUTO: 0.01 K/UL
BASOPHILS NFR BLD: 0.2 %
BILIRUB SERPL-MCNC: 0.5 MG/DL
BILIRUB UR QL STRIP: NEGATIVE
BUN SERPL-MCNC: 21 MG/DL
CALCIUM SERPL-MCNC: 8.5 MG/DL
CHLORIDE SERPL-SCNC: 106 MMOL/L
CLARITY UR: CLEAR
CO2 SERPL-SCNC: 23 MMOL/L
COLOR UR: YELLOW
CREAT SERPL-MCNC: 1.3 MG/DL
DIFFERENTIAL METHOD: ABNORMAL
EOSINOPHIL # BLD AUTO: 0.2 K/UL
EOSINOPHIL NFR BLD: 3.6 %
ERYTHROCYTE [DISTWIDTH] IN BLOOD BY AUTOMATED COUNT: 13.2 %
EST. GFR  (AFRICAN AMERICAN): 53 ML/MIN/1.73 M^2
EST. GFR  (NON AFRICAN AMERICAN): 46 ML/MIN/1.73 M^2
GLUCOSE SERPL-MCNC: 185 MG/DL
GLUCOSE UR QL STRIP: NEGATIVE
HCT VFR BLD AUTO: 40.3 %
HGB BLD-MCNC: 13.3 G/DL
HGB UR QL STRIP: ABNORMAL
INR PPP: 1
KETONES UR QL STRIP: NEGATIVE
LEUKOCYTE ESTERASE UR QL STRIP: NEGATIVE
LYMPHOCYTES # BLD AUTO: 1.6 K/UL
LYMPHOCYTES NFR BLD: 24.8 %
MAGNESIUM SERPL-MCNC: 2 MG/DL
MCH RBC QN AUTO: 32 PG
MCHC RBC AUTO-ENTMCNC: 33 G/DL
MCV RBC AUTO: 97 FL
MICROSCOPIC COMMENT: ABNORMAL
MONOCYTES # BLD AUTO: 0.4 K/UL
MONOCYTES NFR BLD: 6.6 %
NEUTROPHILS # BLD AUTO: 4.1 K/UL
NEUTROPHILS NFR BLD: 64.6 %
NITRITE UR QL STRIP: NEGATIVE
PH UR STRIP: 7 [PH] (ref 5–8)
PLATELET # BLD AUTO: 173 K/UL
PMV BLD AUTO: 9.9 FL
POCT GLUCOSE: 203 MG/DL (ref 70–110)
POTASSIUM SERPL-SCNC: 3.9 MMOL/L
PROT SERPL-MCNC: 6 G/DL
PROT UR QL STRIP: NEGATIVE
PROTHROMBIN TIME: 10.7 SEC
RBC # BLD AUTO: 4.16 M/UL
RBC #/AREA URNS HPF: 7 /HPF (ref 0–4)
SODIUM SERPL-SCNC: 138 MMOL/L
SP GR UR STRIP: 1.02 (ref 1–1.03)
SQUAMOUS #/AREA URNS HPF: 0 /HPF
TROPONIN I SERPL DL<=0.01 NG/ML-MCNC: <0.006 NG/ML
TSH SERPL DL<=0.005 MIU/L-ACNC: 2.86 UIU/ML
URN SPEC COLLECT METH UR: ABNORMAL
UROBILINOGEN UR STRIP-ACNC: NEGATIVE EU/DL
WBC # BLD AUTO: 6.36 K/UL
WBC #/AREA URNS HPF: 4 /HPF (ref 0–5)
YEAST URNS QL MICRO: ABNORMAL

## 2017-09-12 PROCEDURE — 83735 ASSAY OF MAGNESIUM: CPT

## 2017-09-12 PROCEDURE — 85025 COMPLETE CBC W/AUTO DIFF WBC: CPT

## 2017-09-12 PROCEDURE — 80053 COMPREHEN METABOLIC PANEL: CPT

## 2017-09-12 PROCEDURE — 99284 EMERGENCY DEPT VISIT MOD MDM: CPT | Mod: 25

## 2017-09-12 PROCEDURE — 84443 ASSAY THYROID STIM HORMONE: CPT

## 2017-09-12 PROCEDURE — 84484 ASSAY OF TROPONIN QUANT: CPT

## 2017-09-12 PROCEDURE — 85610 PROTHROMBIN TIME: CPT

## 2017-09-12 PROCEDURE — 82962 GLUCOSE BLOOD TEST: CPT

## 2017-09-12 PROCEDURE — 81000 URINALYSIS NONAUTO W/SCOPE: CPT

## 2017-09-12 PROCEDURE — 93005 ELECTROCARDIOGRAM TRACING: CPT

## 2017-09-12 NOTE — ED PROVIDER NOTES
Encounter Date: 9/12/2017       History     Chief Complaint   Patient presents with    Loss of Consciousness     witnessed syncopal episode, currently oriented back to his baseline with history of dementia, sent from group home     89-year-old afebrile male with past medical history of syncope, frequent UTIs, BPH, [facility] ED, dementia, HTN, osteoarthritis presents to the ED status post syncopal episode.  Per nursing home patient was found to be unconscious and EMS was called.  EMS reports that patient was arousable and initially found to be hypotensive with blood pressure of systolic of 94.  This improved after approximately 250 L of IV fluids.  Patient has remained alert with no complaints since this time.  He does appear disoriented as he does not know place or date or time; per EMS this is his baseline.  He is unsure of what happened today and she denies feeling ill complaining of any other symptoms presently.  He denies any pain, fever, chills, headache, shortness of breath, chest pain, palpitations, vomiting, numbness, tingling or rash      The history is provided by the EMS personnel. The history is limited by the absence of a caregiver.     Review of patient's allergies indicates:  No Known Allergies  Past Medical History:   Diagnosis Date    BPH (benign prostatic hyperplasia)     Chronic kidney disease     kidney stones    Dementia     Hypertension     Osteoarthritis     Weight loss      Past Surgical History:   Procedure Laterality Date    HEMORRHOID SURGERY      Renal stone extraction       Family History   Problem Relation Age of Onset    Stroke Mother     Heart disease Father      Social History   Substance Use Topics    Smoking status: Never Smoker    Smokeless tobacco: Never Used      Comment: The patient lives at home with his wife who is impaired. His daughter recently has been caring friend, Shakira.    Alcohol use Yes      Comment: whiskey or gin rarely     Review of Systems   Constitutional:  Negative for appetite change, chills and fever.   HENT: Negative for sore throat.    Eyes: Negative for visual disturbance.   Respiratory: Negative for shortness of breath.    Cardiovascular: Negative for chest pain and palpitations.   Gastrointestinal: Negative for abdominal pain, nausea and vomiting.   Genitourinary: Negative for dysuria.   Musculoskeletal: Negative for arthralgias, back pain and myalgias.   Skin: Negative for pallor and rash.   Neurological: Positive for syncope (witnessed syncope). Negative for headaches.   Hematological: Does not bruise/bleed easily.   Psychiatric/Behavioral: Positive for confusion (presently at baseline per EMS). Negative for hallucinations.       Physical Exam     Initial Vitals   BP Pulse Resp Temp SpO2   -- -- -- -- --      MAP       --         Physical Exam    Nursing note and vitals reviewed.  Constitutional: Vital signs are normal. He appears well-developed and well-nourished. He is not diaphoretic. He is cooperative.  Non-toxic appearance. He has a sickly appearance. He does not appear ill. No distress.   Chronically frail-appearing   HENT:   Head: Normocephalic and atraumatic.   Mouth/Throat: Oropharynx is clear and moist. Mucous membranes are not dry.   Eyes: Conjunctivae and lids are normal.   Neck: Trachea normal and normal range of motion. Neck supple. No stridor present.   Cardiovascular: Normal rate and regular rhythm.   Pulmonary/Chest: Breath sounds normal. No respiratory distress. He has no wheezes.   Abdominal: Soft. Normal appearance. There is no tenderness.   Musculoskeletal:   Fair range of motion of all extremities with strength equal bilaterally.   Neurological: He is alert. GCS eye subscore is 4. GCS verbal subscore is 5. GCS motor subscore is 6.   Sensation grossly intact   Skin: Skin is warm, dry and intact. No rash noted.   Psychiatric: He has a normal mood and affect. His speech is normal and behavior is normal. Thought content normal.         ED  Course   Procedures  Labs Reviewed - No data to display          Medical Decision Making:   Initial Assessment:   89-year-old afebrile male with past medical history of syncope, frequent UTIs, BPH, [facility] ED, dementia, HTN, osteoarthritis presents to the ED status post syncopal episode.  Per nursing home patient was found to be unconscious and EMS was called.  EMS reports that patient was arousable and initially found to be hypotensive with blood pressure of systolic of 94.  This improved after approximately 250 L of IV fluids.  Patient has remained alert with no complaints since this time.  He does appear disoriented as he does not know place or date or time; per EMS this is his baseline.  He is unsure of what happened today and she denies feeling ill complaining of any other symptoms presently.  He denies any pain, fever, chills, headache, shortness of breath, chest pain, palpitations, vomiting, numbness, tingling or rash.  Physical exam reveals chronically frail-appearing male however in no obvious distress and nontoxic in appearance.  Mucous membranes are noted to be moist and free of pallor.  Lungs clear to auscultation; heart regular rate and rhythm.  Abdomen is soft and nontender. Fair ROM of all extremities with strength equal bilaterally.  Skin free of rash, pallor or diaphoresis.  Sensation grossly intact  ED Management:  Patient is alert and oriented to himself but not place, date or time.  This is reportedly patient's baseline.  Lab work since then for elevated blood glucose at 203 remaining labs are unremarkable.  Chest x-ray is unremarkable.  EKG with no acute abnormalities.  Patient does not have any positive [place] syncope rules positive at this time and we believe that patient is stable to go home with close follow-up and evaluation of blood pressure by nursing home staff and MD.  Patient's vitals remained stable throughout ED course.                Attending Attestation:     Physician Attestation  Statement for NP/PA:   I have conducted a face to face encounter with this patient in addition to the NP/PA, due to NP/PA Request    Other NP/PA Attestation Additions:    History of Present Illness: Agree; 89-year-old male sent to the emergency department from nursing home after allegedly syncopal episode.  Nursing home reports finding the patient unconscious.  EMS reports giving 250 mL of IV fluid.  Patient has no complaints on arrival.   Physical Exam: Agree   Medical Decision Making: Agree; patient has been awake, alert, stable for throughout his emergency department stay.  Does not meet any [place] criteria for syncope.  He has no complaints at this time.  We informed him of plan to discharge back to the nursing home.                 ED Course      Clinical Impression:   The primary encounter diagnosis was Blood glucose elevated. A diagnosis of Syncope was also pertinent to this visit.                           MARLI Hoskins  09/15/17 1255       Anastacio Rangel MD  09/19/17 0106

## 2017-09-12 NOTE — ED NOTES
Pt presents to ED per EMS from NH with c/o syncope. Per EMS, NH reports pt was unresponsive. Per EMS, on scene pt was hypotensive with systolic 90. Pt received approx 250 cc of LR en route and became more awake and alert. Unsure if LOC was witnessed. On arrival, pt awake and alert. Pt oriented to self and place. Disoriented to situation. Pt states he does not remember passing out. Pt placed on cardiac monitor, bp cuff and continuous pulse ox.

## 2017-09-27 RX ORDER — TAMSULOSIN HYDROCHLORIDE 0.4 MG/1
CAPSULE ORAL
Qty: 28 CAPSULE | Refills: 6 | Status: SHIPPED | OUTPATIENT
Start: 2017-09-27 | End: 2018-04-11 | Stop reason: SDUPTHER

## 2017-11-17 ENCOUNTER — TELEPHONE (OUTPATIENT)
Dept: INTERNAL MEDICINE | Facility: CLINIC | Age: 82
End: 2017-11-17

## 2017-11-17 NOTE — TELEPHONE ENCOUNTER
Hi, I recommend that he come in to be evaluate for a urine infection, please offer an urgent care visit.  Thank you, Anthony Brody

## 2017-11-17 NOTE — TELEPHONE ENCOUNTER
Spoke to Jayna (assistant living) and states that for the pass couple of days patient urine has been very thick and cloudy, no fever----pls advise----

## 2017-11-17 NOTE — TELEPHONE ENCOUNTER
----- Message from Pascale Schwartz sent at 11/17/2017  2:44 PM CST -----  Contact: Jayna [facility] [phone]  Returned Call    Who left the message: Osiris Box MA     When did the practice call: 1117/2017 2:30pm    Please advise.    Thank You

## 2017-11-17 NOTE — TELEPHONE ENCOUNTER
----- Message from Pascale Schwartz sent at 11/17/2017  1:19 PM CST -----  Contact: Jayna [facility] [phone]  Urine has been very cloudy and thick and the nurse would like to speak with you.    Please call and advise.    Thank You

## 2018-02-20 ENCOUNTER — TELEPHONE (OUTPATIENT)
Dept: INTERNAL MEDICINE | Facility: CLINIC | Age: 83
End: 2018-02-20

## 2018-02-20 RX ORDER — SULFAMETHOXAZOLE AND TRIMETHOPRIM 400; 80 MG/1; MG/1
1 TABLET ORAL 2 TIMES DAILY
Qty: 14 TABLET | Refills: 0 | Status: SHIPPED | OUTPATIENT
Start: 2018-02-20

## 2018-02-20 NOTE — TELEPHONE ENCOUNTER
----- Message from Jessica Kent sent at 2/19/2018  3:32 PM CST -----  Contact: Jayna [facility] Nurse [phone]  Jayna was calling regarding the pt has been having discharged in his urine ,please call

## 2018-02-20 NOTE — TELEPHONE ENCOUNTER
Spoke to Jayna with [facility] and states that the patient about 1 week ago started with cloudy urine, started over the weekend with cloudy/thick discharged in urine, no fever, patient does sleep a lot and does not seem to be complaining, goes freq.-----pls advise

## 2018-04-11 RX ORDER — TAMSULOSIN HYDROCHLORIDE 0.4 MG/1
CAPSULE ORAL
Qty: 28 CAPSULE | Refills: 6 | Status: SHIPPED | OUTPATIENT
Start: 2018-04-11